# Patient Record
Sex: FEMALE | Race: WHITE | Employment: FULL TIME | URBAN - METROPOLITAN AREA
[De-identification: names, ages, dates, MRNs, and addresses within clinical notes are randomized per-mention and may not be internally consistent; named-entity substitution may affect disease eponyms.]

---

## 2018-08-30 LAB
ANTIBODY SCREEN, EXTERNAL: NEGATIVE
CHLAMYDIA, EXTERNAL: NEGATIVE
HBSAG, EXTERNAL: NEGATIVE
HIV, EXTERNAL: NEGATIVE
N. GONORRHEA, EXTERNAL: NEGATIVE
RUBELLA, EXTERNAL: NORMAL
T. PALLIDUM, EXTERNAL: NEGATIVE

## 2019-03-06 LAB — GRBS, EXTERNAL: NEGATIVE

## 2019-04-07 ENCOUNTER — ANESTHESIA (OUTPATIENT)
Dept: LABOR AND DELIVERY | Age: 29
End: 2019-04-07
Payer: COMMERCIAL

## 2019-04-07 ENCOUNTER — HOSPITAL ENCOUNTER (INPATIENT)
Age: 29
LOS: 2 days | Discharge: HOME | End: 2019-04-09
Attending: OBSTETRICS & GYNECOLOGY | Admitting: OBSTETRICS & GYNECOLOGY
Payer: COMMERCIAL

## 2019-04-07 ENCOUNTER — ANESTHESIA EVENT (OUTPATIENT)
Dept: LABOR AND DELIVERY | Age: 29
End: 2019-04-07
Payer: COMMERCIAL

## 2019-04-07 LAB
BASOPHILS # BLD: 0.1 K/UL (ref 0–0.1)
BASOPHILS NFR BLD: 1 % (ref 0–1)
DIFFERENTIAL METHOD BLD: ABNORMAL
EOSINOPHIL # BLD: 0 K/UL (ref 0–0.4)
EOSINOPHIL NFR BLD: 0 % (ref 0–7)
ERYTHROCYTE [DISTWIDTH] IN BLOOD BY AUTOMATED COUNT: 13 % (ref 11.5–14.5)
HCT VFR BLD AUTO: 37 % (ref 35–47)
HGB BLD-MCNC: 12.7 G/DL (ref 11.5–16)
IMM GRANULOCYTES # BLD AUTO: 0 K/UL
IMM GRANULOCYTES NFR BLD AUTO: 0 %
LYMPHOCYTES # BLD: 1.2 K/UL (ref 0.8–3.5)
LYMPHOCYTES NFR BLD: 8 % (ref 12–49)
MCH RBC QN AUTO: 31.6 PG (ref 26–34)
MCHC RBC AUTO-ENTMCNC: 34.3 G/DL (ref 30–36.5)
MCV RBC AUTO: 92 FL (ref 80–99)
MONOCYTES # BLD: 1.2 K/UL (ref 0–1)
MONOCYTES NFR BLD: 8 % (ref 5–13)
MYELOCYTES NFR BLD MANUAL: 2 %
NEUTS BAND NFR BLD MANUAL: 2 % (ref 0–6)
NEUTS SEG # BLD: 11.8 K/UL (ref 1.8–8)
NEUTS SEG NFR BLD: 79 % (ref 32–75)
NRBC # BLD: 0 K/UL (ref 0–0.01)
NRBC BLD-RTO: 0 PER 100 WBC
PLATELET # BLD AUTO: 146 K/UL (ref 150–400)
PLATELET COMMENTS,PCOM: ABNORMAL
PMV BLD AUTO: 11.5 FL (ref 8.9–12.9)
RBC # BLD AUTO: 4.02 M/UL (ref 3.8–5.2)
RBC MORPH BLD: ABNORMAL
WBC # BLD AUTO: 14.6 K/UL (ref 3.6–11)

## 2019-04-07 PROCEDURE — 59025 FETAL NON-STRESS TEST: CPT

## 2019-04-07 PROCEDURE — 75410000002 HC LABOR FEE PER 1 HR: Performed by: OBSTETRICS & GYNECOLOGY

## 2019-04-07 PROCEDURE — 76060000078 HC EPIDURAL ANESTHESIA: Performed by: ANESTHESIOLOGY

## 2019-04-07 PROCEDURE — 65270000029 HC RM PRIVATE

## 2019-04-07 PROCEDURE — 74011000250 HC RX REV CODE- 250: Performed by: ANESTHESIOLOGY

## 2019-04-07 PROCEDURE — 77030014125 HC TY EPDRL BBMI -B: Performed by: ANESTHESIOLOGY

## 2019-04-07 PROCEDURE — 77010026065 HC OXYGEN MINIMUM MEDICAL AIR: Performed by: OBSTETRICS & GYNECOLOGY

## 2019-04-07 PROCEDURE — 85025 COMPLETE CBC W/AUTO DIFF WBC: CPT

## 2019-04-07 PROCEDURE — 74011250636 HC RX REV CODE- 250/636: Performed by: OBSTETRICS & GYNECOLOGY

## 2019-04-07 PROCEDURE — 75410000000 HC DELIVERY VAGINAL/SINGLE: Performed by: OBSTETRICS & GYNECOLOGY

## 2019-04-07 PROCEDURE — 99283 EMERGENCY DEPT VISIT LOW MDM: CPT

## 2019-04-07 PROCEDURE — 0HQ9XZZ REPAIR PERINEUM SKIN, EXTERNAL APPROACH: ICD-10-PCS | Performed by: OBSTETRICS & GYNECOLOGY

## 2019-04-07 PROCEDURE — 36415 COLL VENOUS BLD VENIPUNCTURE: CPT

## 2019-04-07 PROCEDURE — 74011250636 HC RX REV CODE- 250/636

## 2019-04-07 PROCEDURE — 75410000003 HC RECOV DEL/VAG/CSECN EA 0.5 HR: Performed by: OBSTETRICS & GYNECOLOGY

## 2019-04-07 PROCEDURE — 3E0R3BZ INTRODUCTION OF ANESTHETIC AGENT INTO SPINAL CANAL, PERCUTANEOUS APPROACH: ICD-10-PCS | Performed by: ANESTHESIOLOGY

## 2019-04-07 PROCEDURE — 74011250636 HC RX REV CODE- 250/636: Performed by: ANESTHESIOLOGY

## 2019-04-07 PROCEDURE — 74011000250 HC RX REV CODE- 250

## 2019-04-07 PROCEDURE — 74011250637 HC RX REV CODE- 250/637: Performed by: OBSTETRICS & GYNECOLOGY

## 2019-04-07 RX ORDER — BUPIVACAINE HYDROCHLORIDE 2.5 MG/ML
INJECTION, SOLUTION EPIDURAL; INFILTRATION; INTRACAUDAL AS NEEDED
Status: DISCONTINUED | OUTPATIENT
Start: 2019-04-07 | End: 2019-04-07 | Stop reason: HOSPADM

## 2019-04-07 RX ORDER — SIMETHICONE 80 MG
80 TABLET,CHEWABLE ORAL
Status: DISCONTINUED | OUTPATIENT
Start: 2019-04-07 | End: 2019-04-09 | Stop reason: HOSPADM

## 2019-04-07 RX ORDER — IBUPROFEN 800 MG/1
800 TABLET ORAL EVERY 8 HOURS
Status: DISCONTINUED | OUTPATIENT
Start: 2019-04-07 | End: 2019-04-09 | Stop reason: HOSPADM

## 2019-04-07 RX ORDER — OXYTOCIN/0.9 % SODIUM CHLORIDE 30/500 ML
500 PLASTIC BAG, INJECTION (ML) INTRAVENOUS
Status: DISCONTINUED | OUTPATIENT
Start: 2019-04-07 | End: 2019-04-09 | Stop reason: HOSPADM

## 2019-04-07 RX ORDER — PEPPERMINT OIL
SPIRIT ORAL
Status: DISPENSED
Start: 2019-04-07 | End: 2019-04-08

## 2019-04-07 RX ORDER — HYDROMORPHONE HYDROCHLORIDE 2 MG/ML
1 INJECTION, SOLUTION INTRAMUSCULAR; INTRAVENOUS; SUBCUTANEOUS
Status: COMPLETED | OUTPATIENT
Start: 2019-04-07 | End: 2019-04-07

## 2019-04-07 RX ORDER — OXYTOCIN/RINGER'S LACTATE 20/1000 ML
PLASTIC BAG, INJECTION (ML) INTRAVENOUS
Status: COMPLETED
Start: 2019-04-07 | End: 2019-04-07

## 2019-04-07 RX ORDER — ACETAMINOPHEN 325 MG/1
650 TABLET ORAL
Status: DISCONTINUED | OUTPATIENT
Start: 2019-04-07 | End: 2019-04-09 | Stop reason: HOSPADM

## 2019-04-07 RX ORDER — EPHEDRINE SULFATE/0.9% NACL/PF 50 MG/5 ML
SYRINGE (ML) INTRAVENOUS
Status: DISCONTINUED
Start: 2019-04-07 | End: 2019-04-07 | Stop reason: WASHOUT

## 2019-04-07 RX ORDER — SWAB
1 SWAB, NON-MEDICATED MISCELLANEOUS DAILY
Status: DISCONTINUED | OUTPATIENT
Start: 2019-04-08 | End: 2019-04-09 | Stop reason: HOSPADM

## 2019-04-07 RX ORDER — NALBUPHINE HYDROCHLORIDE 10 MG/ML
2.5 INJECTION, SOLUTION INTRAMUSCULAR; INTRAVENOUS; SUBCUTANEOUS
Status: ACTIVE | OUTPATIENT
Start: 2019-04-07 | End: 2019-04-07

## 2019-04-07 RX ORDER — SODIUM CHLORIDE, SODIUM LACTATE, POTASSIUM CHLORIDE, CALCIUM CHLORIDE 600; 310; 30; 20 MG/100ML; MG/100ML; MG/100ML; MG/100ML
125 INJECTION, SOLUTION INTRAVENOUS CONTINUOUS
Status: DISCONTINUED | OUTPATIENT
Start: 2019-04-07 | End: 2019-04-09 | Stop reason: HOSPADM

## 2019-04-07 RX ORDER — FENTANYL/BUPIVACAINE/NS/PF 2-1250MCG
1-16 PREFILLED PUMP RESERVOIR EPIDURAL CONTINUOUS
Status: DISCONTINUED | OUTPATIENT
Start: 2019-04-07 | End: 2019-04-08 | Stop reason: HOSPADM

## 2019-04-07 RX ORDER — HYDROCODONE BITARTRATE AND ACETAMINOPHEN 5; 325 MG/1; MG/1
1 TABLET ORAL
Status: DISCONTINUED | OUTPATIENT
Start: 2019-04-07 | End: 2019-04-09 | Stop reason: HOSPADM

## 2019-04-07 RX ORDER — HYDROCORTISONE ACETATE PRAMOXINE HCL 2.5; 1 G/100G; G/100G
CREAM TOPICAL AS NEEDED
Status: DISCONTINUED | OUTPATIENT
Start: 2019-04-07 | End: 2019-04-09 | Stop reason: HOSPADM

## 2019-04-07 RX ORDER — MISOPROSTOL 200 UG/1
1000 TABLET ORAL ONCE
Status: COMPLETED | OUTPATIENT
Start: 2019-04-07 | End: 2019-04-07

## 2019-04-07 RX ORDER — LIDOCAINE HYDROCHLORIDE AND EPINEPHRINE 20; 5 MG/ML; UG/ML
INJECTION, SOLUTION EPIDURAL; INFILTRATION; INTRACAUDAL; PERINEURAL AS NEEDED
Status: DISCONTINUED | OUTPATIENT
Start: 2019-04-07 | End: 2019-04-07 | Stop reason: HOSPADM

## 2019-04-07 RX ORDER — ONDANSETRON 2 MG/ML
4 INJECTION INTRAMUSCULAR; INTRAVENOUS
Status: DISCONTINUED | OUTPATIENT
Start: 2019-04-07 | End: 2019-04-09 | Stop reason: HOSPADM

## 2019-04-07 RX ORDER — OXYTOCIN/RINGER'S LACTATE 20/1000 ML
125 PLASTIC BAG, INJECTION (ML) INTRAVENOUS ONCE
Status: COMPLETED | OUTPATIENT
Start: 2019-04-07 | End: 2019-04-07

## 2019-04-07 RX ORDER — DOCUSATE SODIUM 100 MG/1
100 CAPSULE, LIQUID FILLED ORAL
Status: DISCONTINUED | OUTPATIENT
Start: 2019-04-07 | End: 2019-04-09 | Stop reason: HOSPADM

## 2019-04-07 RX ORDER — CEFAZOLIN SODIUM/WATER 2 G/20 ML
2 SYRINGE (ML) INTRAVENOUS ONCE
Status: COMPLETED | OUTPATIENT
Start: 2019-04-07 | End: 2019-04-07

## 2019-04-07 RX ORDER — DIPHENHYDRAMINE HYDROCHLORIDE 50 MG/ML
12.5 INJECTION, SOLUTION INTRAMUSCULAR; INTRAVENOUS
Status: DISCONTINUED | OUTPATIENT
Start: 2019-04-07 | End: 2019-04-09 | Stop reason: HOSPADM

## 2019-04-07 RX ORDER — NALOXONE HYDROCHLORIDE 0.4 MG/ML
0.4 INJECTION, SOLUTION INTRAMUSCULAR; INTRAVENOUS; SUBCUTANEOUS AS NEEDED
Status: DISCONTINUED | OUTPATIENT
Start: 2019-04-07 | End: 2019-04-09 | Stop reason: HOSPADM

## 2019-04-07 RX ADMIN — BUPIVACAINE HYDROCHLORIDE 1 ML: 2.5 INJECTION, SOLUTION EPIDURAL; INFILTRATION; INTRACAUDAL at 17:17

## 2019-04-07 RX ADMIN — SODIUM CHLORIDE, SODIUM LACTATE, POTASSIUM CHLORIDE, AND CALCIUM CHLORIDE 125 ML/HR: 600; 310; 30; 20 INJECTION, SOLUTION INTRAVENOUS at 18:17

## 2019-04-07 RX ADMIN — Medication 12 ML/HR: at 18:22

## 2019-04-07 RX ADMIN — MISOPROSTOL 1000 MCG: 200 TABLET ORAL at 20:54

## 2019-04-07 RX ADMIN — IBUPROFEN 800 MG: 800 TABLET ORAL at 21:46

## 2019-04-07 RX ADMIN — SODIUM CHLORIDE, SODIUM LACTATE, POTASSIUM CHLORIDE, AND CALCIUM CHLORIDE 1000 ML: 600; 310; 30; 20 INJECTION, SOLUTION INTRAVENOUS at 17:30

## 2019-04-07 RX ADMIN — Medication 5000 MILLI-UNITS/HR: at 21:37

## 2019-04-07 RX ADMIN — Medication 10000 MILLI-UNITS/HR: at 20:48

## 2019-04-07 RX ADMIN — SODIUM CHLORIDE, SODIUM LACTATE, POTASSIUM CHLORIDE, AND CALCIUM CHLORIDE 1000 ML: 600; 310; 30; 20 INJECTION, SOLUTION INTRAVENOUS at 17:00

## 2019-04-07 RX ADMIN — OXYTOCIN-SODIUM CHLORIDE 0.9% IV SOLN 30 UNIT/500ML 999 ML/HR: 30-0.9/5 SOLUTION at 19:46

## 2019-04-07 RX ADMIN — HYDROMORPHONE HYDROCHLORIDE 1 MG: 2 INJECTION INTRAMUSCULAR; INTRAVENOUS; SUBCUTANEOUS at 21:00

## 2019-04-07 RX ADMIN — Medication 2 G: at 21:33

## 2019-04-07 RX ADMIN — BUPIVACAINE HYDROCHLORIDE 5 ML: 2.5 INJECTION, SOLUTION EPIDURAL; INFILTRATION; INTRACAUDAL at 17:24

## 2019-04-07 RX ADMIN — LIDOCAINE HYDROCHLORIDE AND EPINEPHRINE 4 ML: 20; 5 INJECTION, SOLUTION EPIDURAL; INFILTRATION; INTRACAUDAL; PERINEURAL at 17:20

## 2019-04-07 NOTE — H&P
History & Physical 
 
Name: Manuel Orozco MRN: [id number]  SSN: [id number] YOB: 1990  Age: 33 y.o. Sex: female Subjective:  
 
Estimated Date of Delivery: 4/10/19 OB History  Para Term  AB Living 2 1 1     1 SAB TAB Ectopic Molar Multiple Live Births 1  
  
# Outcome Date GA Lbr Isac/2nd Weight Sex Delivery Anes PTL Lv  
2 Current 1 Term 14 40w2d  3.74 kg M VAGINAL DELI EPIDURAL AN N TAINA Ms. Alona Sandoval is a  admitted with pregnancy at 39w4d for active labor. The presents with regular painful uterine contractions and she is now comfortable with her epidural.  Denies leakage of vaginal fluid and vaginal bleeding. Prenatal course was normal. Please see prenatal records for details. Prenatal Labs: 
O pos Rubella imm HIV neg Heb B neg TPA neg GC neg Chlamydia neg GBS neg 
1 hr gtt 82 Past Medical History:  
Diagnosis Date  Anemia   
 taking iron pills  Asthma  Chest pain  Depression  Generalized headaches  SOB (shortness of breath) UTI History reviewed. No pertinent surgical history. Social History Occupational History  Not on file Tobacco Use  Smoking status: Never Smoker  Smokeless tobacco: Never Used Substance and Sexual Activity  Alcohol use: No  
  Comment: social  
 Drug use: No  
 Sexual activity: Not on file History reviewed. No pertinent family history. Allergies Allergen Reactions  Erythromycin Unknown (comments)  Sulfa (Sulfonamide Antibiotics) Unknown (comments) Prior to Admission medications Medication Sig Start Date End Date Taking? Authorizing Provider PNV no.24-iron-folic acid-dha (PRENATAL DHA+COMPLETE PRENATAL) 30975-300 mg-mcg-mg cmpk Take  by mouth. Yes Provider, Historical  
ferrous sulfate (IRON) 325 mg (65 mg iron) tablet Take  by mouth Daily (before breakfast).    Yes Provider, Historical  
 sertraline (ZOLOFT) 25 mg tablet Take 50 mg by mouth daily. 11  Yes Provider, Historical  
oxyCODONE-acetaminophen (PERCOCET) 5-325 mg per tablet Take 1 tablet by mouth every six (6) hours as needed for Pain. 14   Rob Samayoa MD  
LODRANE 24 EXTENDED RELEASE 12 mg Cp24  10/20/10   Provider, Historical  
  
 
Review of Systems: Pertinent items are noted in HPI. Objective:  
 
Vitals: 
Vitals:  
 19 1650 19 1655 19 1657 19 1711 BP:  93/77 Pulse: 94 81 Resp: 16 Temp: 98.3 °F (36.8 °C) SpO2: 99%  (!) 89% Weight:    89.4 kg (197 lb) Height:    5' 8\" (1.727 m) Physical Exam: 
Patient without distress. Heart: Regular rate and rhythm or S1S2 present Lung: clear to auscultation throughout lung fields, no wheezes, no rales, no rhonchi and normal respiratory effort Abdomen: soft, nontender, gravid, EFW 8 pounds Fundus: soft and non tender Perineum: blood absent, amniotic fluid absent Cervical Exam: 10/100/-4 vtx, large bbow, adequate pelvimetry Lower Extremities:  - Edema No 
Membranes:  Intact Fetal Heart Rate: Baseline: 125 per minute Variability: moderate Accelerations: yes Decelerations: early Uterine contractions: regular, every 1-2 minutes Assessment/Plan:  
Ass:  at 39 4/7 wks in second stage of labor, cat 1 fetal tracing Plan: Labor down Amniotomy once the fetus is at a lower station Prepare for  Signed By:  Jc Hicks MD   
 2019

## 2019-04-07 NOTE — ANESTHESIA PROCEDURE NOTES
CSE Block    Start time: 4/7/2019 5:12 PM  End time: 4/7/2019 5:28 PM  Performed by: Chelsy Lal MD  Authorized by: Chelsy Lal MD     Pre-Procedure  Indications: procedure for pain    preanesthetic checklist: patient identified, risks and benefits discussed, anesthesia consent, site marked, patient being monitored and timeout performed    Timeout Time: 17:14        Procedure:   Patient Position:  Seated  Prep Region:  Lumbar  Prep: patient draped and chlorhexidine    Location:  L2-3    Epidural Needle:   Needle Type:  Tuohy  Needle Gauge:  17 G  Injection Technique:  Loss of resistance using air  Attempts:  1    Spinal Needle:   Needle Type:  Pencan  Needle Gauge:  27 G    Catheter:   Catheter Type:  Standard  Catheter Size:  20 G  Catheter at Skin Depth (cm):  9  Depth in Epidural Space (cm):  4  Events: no blood with aspiration, no paresthesia, negative aspiration test and CSF confirmed      Assessment:   Catheter Secured:  Tegaderm and tape  Insertion:  Uncomplicated  Patient tolerance:  Patient tolerated the procedure well with no immediate complications
negative

## 2019-04-07 NOTE — ANESTHESIA PREPROCEDURE EVALUATION
Relevant Problems No relevant active problems Anesthetic History No history of anesthetic complications Review of Systems / Medical History Patient summary reviewed, nursing notes reviewed and pertinent labs reviewed Pulmonary Asthma : well controlled Neuro/Psych Pertinent negatives: No psychiatric history Cardiovascular Exercise tolerance: >4 METS 
  
GI/Hepatic/Renal 
Within defined limits Endo/Other Within defined limits Other Findings Physical Exam 
 
Airway Mallampati: II 
 
Neck ROM: normal range of motion Mouth opening: Normal 
 
 Cardiovascular Rhythm: regular Rate: normal 
 
 
 
 Dental 
No notable dental hx Pulmonary Abdominal 
GI exam deferred Other Findings Anesthetic Plan ASA: 2 Anesthesia type: CSE Anesthetic plan and risks discussed with: Patient

## 2019-04-08 PROCEDURE — 65270000029 HC RM PRIVATE

## 2019-04-08 PROCEDURE — 74011250637 HC RX REV CODE- 250/637: Performed by: OBSTETRICS & GYNECOLOGY

## 2019-04-08 PROCEDURE — 77030016394 HC TY CIRC TRIS -B

## 2019-04-08 PROCEDURE — 77030021125

## 2019-04-08 RX ORDER — SERTRALINE HYDROCHLORIDE 50 MG/1
50 TABLET, FILM COATED ORAL EVERY EVENING
Status: DISCONTINUED | OUTPATIENT
Start: 2019-04-08 | End: 2019-04-09 | Stop reason: HOSPADM

## 2019-04-08 RX ADMIN — IBUPROFEN 800 MG: 800 TABLET ORAL at 15:02

## 2019-04-08 RX ADMIN — HYDROCODONE BITARTRATE AND ACETAMINOPHEN 1 TABLET: 5; 325 TABLET ORAL at 10:07

## 2019-04-08 RX ADMIN — SERTRALINE HYDROCHLORIDE 50 MG: 50 TABLET ORAL at 23:17

## 2019-04-08 RX ADMIN — IBUPROFEN 800 MG: 800 TABLET ORAL at 23:17

## 2019-04-08 RX ADMIN — DOCUSATE SODIUM 100 MG: 100 CAPSULE, LIQUID FILLED ORAL at 10:08

## 2019-04-08 RX ADMIN — Medication 1 TABLET: at 10:07

## 2019-04-08 RX ADMIN — SERTRALINE HYDROCHLORIDE 50 MG: 50 TABLET ORAL at 01:08

## 2019-04-08 RX ADMIN — IBUPROFEN 800 MG: 800 TABLET ORAL at 06:26

## 2019-04-08 NOTE — L&D DELIVERY NOTE
Delivery Summary    Patient: Nic Rice MRN: [id number]  SSN: [id number]    YOB: 1990  Age: 33 y.o. Sex: female       Information for the patient's :  [facility][id number]       Labor Events:    Labor:     Rupture Date:     Rupture Time:     Rupture Type     Amniotic Fluid Volume:      Amniotic Fluid Description:       Induction:         Augmentation:     Labor Events:       Cervical Ripening:           Delivery Events:  Episiotomy: None   Laceration(s): 1st     Repaired: Vicryl 3-0    Number of Repair Packets: 1   Suture Type and Size:       Estimated Blood Loss (ml):  ml       Delivery Date: 2019    Delivery Time: 7:41 PM  Delivery Type:    Sex:  Male     Gestational Age: 39w4d   Delivery Clinician:  Megan Finn  Living Status: Living   Delivery Location: L&D L&D 208          APGARS  One minute Five minutes Ten minutes   Skin color: 1   1        Heart rate: 2   2        Grimace: 1   2        Muscle tone: 1   2        Breathin   2        Totals: 6   9            Presentation: Vertex    Position: Left Occiput Anterior  Resuscitation Method:  Suctioning-bulb; Tactile Stimulation     Meconium Stained: Thin      Cord Information: 3 Vessels  Complications: None  Cord around: head  Delayed cord clamping? Yes  Cord clamped date/time:2019  7:44 PM  Disposition of Cord Blood: Lab    Blood Gases Sent?: No    Placenta:  Date/Time: 2019  7:50 PM  Removal: Spontaneous      Appearance: Normal      Measurements:  Birth Weight:        Birth Length:        Head Circumference:        Chest Circumference:       Abdominal Girth:       Other Providers:   Imelda TOBAR;MARY SANCHEZ;BANDAR MARTIN;PEYMAN MELGOZA;SARA CARLIN, Obstetrician;Primary Nurse;Primary  Nurse;Charge Nurse;Staff Nurse           Group B Strep:   Lab Results   Component Value Date/Time    GrBStrep, External Negative 2014     Information for the patient's : Rebecca Leonardo, Male Dharmesh Reynaga [[id number]]   No results found for: ABORH, PCTABR, PCTDIG, BILI, ABORHEXT, ABORH    No results for input(s): PCO2CB, PO2CB, HCO3I, SO2I, IBD, PTEMPI, SPECTI, PHICB, ISITE, IDEV, IALLEN in the last 72 hours. Additional Comments:   of a term live male infant in [address] [place] [address] with apgars 6,9. There was a loose nuchal cord x 1 that was easily reduced. There was a copious amount of amniotic fluid with light meconium staining that measured at 1 liter. The placenta was delivered spontaneously and intact after which the first degree perineal laceration was repaired with 3-0 Vicryl. Vaginal bleeding was heavier than usual with a uterus that remained firm.  ml.

## 2019-04-08 NOTE — PROGRESS NOTES
1650 Patient arrived on the unit complaining of contractions, SVE by RN 10/100/-2. Will admit patient and update MD. 
 
26 Dr Ceci Garcia at the bedside assessing the patient and getting consent for epidural. Patient agreed and was placed in the proper position. [id number] Timeout done with RN, MD and patient all agree after epidural patient was placed in the supine position, right hip tilt and monitor on. Will continue to monitor. 12 Dr Gabi Carvalho at the bedside assessing patient SVE is complete bulging bag. No AROM at this time. [id number] [facility] called to the room. Patient complaining of SROM. RN confirm, Glenis Sharpe MD notified and called to the room. 200 Dr Gabi Carvalho at the bedside for delivery. 1848 Start pushing 1925 Bedside and Verbal shift change report given to Aron Cole RN (oncoming nurse) by Jeremiah Plunkett RN (offgoing nurse). Report included the following information SBAR, Kardex and MAR.

## 2019-04-08 NOTE — DISCHARGE SUMMARY
Obstetrical Discharge Summary     Name: Dion Delgado MRN: [id number]  SSN: [id number]    YOB: 1990  Age: 33 y.o. Sex: female      Admit Date: 4/7/2019    Discharge Date: 4/9/19     Attending Physician:  Yolande Phillips MD     Delivering Physician:  Lizet Graves MD     * Admission Diagnoses:   IUP @ 39w4d      * Discharge Diagnoses:   Delivery of a VMI via TSVD by Lizet Graves MD on 4/8/2019. Apgars were 6 and 9.      1st degree       Additional Diagnoses:   Hospital Problems as of 4/8/2019 Never Reviewed          Codes Class Noted - Resolved POA    Normal labor and delivery ICD-10-CM: O80  ICD-9-CM: 863  4/7/2019 - Present Unknown             Lab Results   Component Value Date/Time    Rubella, External Immune 08/30/2018    GrBStrep, External Negative 03/06/2019    There is no immunization history for the selected administration types on file for this patient. * Procedures:   TSVD         * Discharge Condition: [facility] ([place], [place]) Course: Normal hospital course following the delivery. * Disposition: Home    Discharge Medications:   Current Discharge Medication List          * Follow-up Care/Patient Instructions:   Activity: Activity as tolerated  Diet: Regular Diet  Wound Care: As directed      Followup 6 weeks for PP check        Signed By:  Arelis Luke MD     April 8, 2019

## 2019-04-08 NOTE — ROUTINE PROCESS
SBAR IN Report: Mother Verbal report received from [address] [place] [address] (full name & credentials) on this patient, who is now being transferred from L&D (unit) for routine progression of care. Report consisted of patient's Situation, Background, Assessment and Recommendations (SBAR).  ID bands were compared with the identification form, and verified with the patient and transferring nurse. Information from the SBAR, Kardex, Intake/Output, MAR and Accordion and the Sharri Report was reviewed with the transferring nurse; opportunity for questions and clarification provided.

## 2019-04-08 NOTE — PROGRESS NOTES
Bedside and Verbal shift change report given to Mary Coates (oncoming nurse) by Jael Lizama. Milind Kim (offgoing nurse). Report given with SBAR, Kardex, Intake/Output and MAR.

## 2019-04-08 NOTE — PROGRESS NOTES
: Bedside and Verbal shift change report given to [address] (oncoming nurse) by Maci Bull RN (offgoing nurse). Report included the following information SBAR, Kardex, Intake/Output, MAR, Accordion, Recent Results and Med Rec Status. Assumed care of pt at this time. Pt continues pushing at this time with OB provider remaining at bedside. Pt pushes adequately with positive coaching from care providers. : Delivery of head. OB suctions baby with bulb syringe at perineum. : 40seconds after delivery of head, delivery of viable  male placed skin to skin with mother at this time. Delayed cord clamping performed. : FOB cuts cord with the direction of the OB provider at this time. : RN at bedside for fundal exam. Clots expressed at this time. Request eval from [facility] provider. Pad with clots 40mL Vicky Garcia MD at bedside. VORB 1000mcg of cytotec, 1mg dilaudid for pain relief during bimanual exam.  
 
: Cytotec administered rectally by Jarod Leonard MD  
 
: bimanual clots expressed 80mL total of clots. Running QBL 670mL 
 
: VORB 2g ancef x1  
 
5: With the assistance of the RN, pt successfully ambulates to the . Pt successfully voids 220mL of clear yellow urine at this time. Kelly care performed. Pt successfully ambulates back to me without the assistance of the RN.  
 
0015: TRANSFER - OUT REPORT: 
 
Verbal report given to Stan Luz RN (name) on [facility] Current  being transferred to MIU (unit) for routine progression of care Report consisted of patients Situation, Background, Assessment and  
Recommendations(SBAR). Information from the following report(s) SBAR, Kardex, Intake/Output, MAR, Accordion, Recent Results and Med Rec Status was reviewed with the receiving nurse. Lines:  
Peripheral IV 19 Anterior;Right Hand (Active) Site Assessment Clean, dry, & intact 2019 12:15 AM  
Phlebitis Assessment 0 2019 12:15 AM  
 Infiltration Assessment 0 4/8/2019 12:15 AM  
Dressing Status Clean, dry, & intact 4/8/2019 12:15 AM  
Dressing Type Tape;Transparent 4/8/2019 12:15 AM  
Hub Color/Line Status Pink;Capped 4/8/2019 12:15 AM  
  
 
Opportunity for questions and clarification was provided. Patient transported with: 
 Registered Nurse

## 2019-04-08 NOTE — PROGRESS NOTES
PostPartum Note Nemesio De Santiago 
[id number] 
1990 
33 y.o. 
 
S:  Ms. Nemesio De Santiago is a 33 y.o.  PPD #1 s/p TSVD @ 39w4d. Doing well. She had a baby boy. Her lochia is like a period. She describes her pain as mild and is well controlled with PO medications. She is breast feeding and this is going well. She is ambulating and voiding. Tolerating PO intake. O:  
Visit Vitals BP 99/52 (BP 1 Location: Left arm, BP Patient Position: At rest) Pulse 65 Temp 98.7 °F (37.1 °C) Resp 18 Ht 5' 8\" (1.727 m) Wt 89.4 kg (197 lb) SpO2 96% Breastfeeding? Yes  
BMI 29.95 kg/m² Lab Results Component Value Date/Time WBC 14.6 (H) 2019 05:06 PM  
 HGB 12.7 2019 05:06 PM  
 HCT 37.0 2019 05:06 PM  
 PLATELET 181 (L)  05:06 PM  
 MCV 92.0 2019 05:06 PM  
 Hgb, External 10.6 2014 Hct, External 32.6 2014 Platelet cnt., External 234 2014 Gen - No acute distress Abdomen - Fundus firm, below the umbilicus Ext - Warm, well perfused. Nontender A/P:  PPD #1 s/p TSVD @ 39w4d doing well. 1.  Routine PP instructions/ care discussed 2. Blood type - Rh pos 3. Rubella imm 4. Circumcision desired and consented 5. Discharge home tomorrow 6. F/U 4-6 weeks for PP check. Edwin Morales MD 
[facility]

## 2019-04-08 NOTE — PROGRESS NOTES
The patient was assessed secondary to passage of small clots with uterine massage. Visit Vitals /64 Pulse 63 Temp 98.3 °F (36.8 °C) Resp 16 Ht 5' 8\" (1.727 m) Wt 89.4 kg (197 lb) SpO2 96% Breastfeeding? Yes  
BMI 29.95 kg/m² 1000 micrograms of rectal cytotec was administered along with Dilaudid 1 mg iv x 1 dose. Abdomen: uterus firm at umbilicus -1 Bimanual pelvic exam: small amount of blood and clots were evacuated and the uterus remained firm at umbilicus - 1 Total EBL:  670 ml Ass/Plan: PPD 0 s/p  with vaginal bleeding Receiving 20 units of pitocin in 1 liter of LR at 125 ml/hr Cytotec 1000 micrograms of rectal  cytotec administered Ancef 2 mg iv x 1 dose Continue to monitor on labor and delivery for now

## 2019-04-08 NOTE — LACTATION NOTE
This note was copied from a baby's chart. Assisted mother with waking baby, positioning, and latching at breast.  Baby latched well in side-lying on left side. BF basics reviewed. Mothers using Kathlen Calamity nipple ointment for nipple pain/inflammation. Discussed with mother her plan for feeding. Reviewed the benefits of exclusive breast milk feeding during the hospital stay. Informed her of the risks of using formula to supplement in the first few days of life as well as the benefits of successful breast milk feeding; referred her to the Breastfeeding booklet about this information. She acknowledges understanding of information reviewed and states that it is her plan to breastfeed her infant. Will support her choice and offer additional information as needed. Reviewed breastfeeding basics:  How milk is made and normal  breastfeeding behaviors discussed. Supply and demand,  stomach size, early feeding cues, skin to skin bonding with comfortable positioning and baby led latch-on reviewed. How to identify signs of successful breastfeeding sessions reviewed; education on assymetrical latch, signs of effective latching vs shallow, in-effective latching, normal  feeding frequency and duration and expected infant output discussed. Hand Expression Education:  Mom taught how to manually hand express her colostrum. Emphasized the importance of providing infant with valuable colostrum as infant rests skin to skin at breast.  Aware to avoid extended periods of non-feeding. Aware to offer 10-20+ drops of colostrum every 2-3 hours until infant is latching and nursing effectively. Taught the rationale behind this low tech but highly effective evidence based practice.  
 
Pt will successfully establish breastfeeding by feeding in response to early feeding cues  
or wake every 3h, will obtain deep latch, and will keep log of feedings/output. Taught to BF at hunger cues and or q 2-3 hrs and to offer 10-20 drops of hand expressed colostrum at any non-feeds. Breast Assessment Left Breast: Medium Left Nipple: Everted, Intact Right Breast: Medium Right Nipple: Everted, Intact Breast- Feeding Assessment Attends Breast-Feeding Classes: No 
Breast-Feeding Experience: Yes(nursed first child for 1 week) Breast Trauma/Surgery: No 
Type/Quality: Good Lactation Consultant Visits Breast-Feedings: Good Mother/Infant Observation Mother Observation: Breast comfortable, Recognizes feeding cues Infant Observation: Rhythmic suck, Relaxed after feeding, Opens mouth, Lips flanged, upper, Lips flanged, lower, Feeding cues, Audible swallows LATCH Documentation Latch: Grasps breast, tongue down, lips flanged, rhythmic sucking Audible Swallowing: A few with stimulation Type of Nipple: Everted (after stimulation) Comfort (Breast/Nipple): Soft/non-tender Hold (Positioning): Full assist, teach one side, mother does other, staff holds LATCH Score: 8

## 2019-04-08 NOTE — ROUTINE PROCESS
Bedside and Verbal shift change report given to L. Janie Dakin (oncoming nurse) by Ariadne Lam RN (offgoing nurse). Report included the following information SBAR, Kardex, Procedure Summary, Intake/Output, MAR and Recent Results.

## 2019-04-08 NOTE — ROUTINE PROCESS
Bedside and Verbal shift change report given to Lianna Rodrigues RN (oncoming nurse) by Fabrizio Caban RN (offgoing nurse). Report included the following information SBAR, Kardex, Intake/Output, MAR and Accordion.

## 2019-04-09 VITALS
HEART RATE: 72 BPM | BODY MASS INDEX: 29.86 KG/M2 | HEIGHT: 68 IN | TEMPERATURE: 99.1 F | WEIGHT: 197 LBS | DIASTOLIC BLOOD PRESSURE: 64 MMHG | RESPIRATION RATE: 16 BRPM | SYSTOLIC BLOOD PRESSURE: 103 MMHG | OXYGEN SATURATION: 96 %

## 2019-04-09 LAB
ERYTHROCYTE [DISTWIDTH] IN BLOOD BY AUTOMATED COUNT: 13.2 % (ref 11.5–14.5)
HCT VFR BLD AUTO: 34.6 % (ref 35–47)
HGB BLD-MCNC: 11.4 G/DL (ref 11.5–16)
MCH RBC QN AUTO: 30.7 PG (ref 26–34)
MCHC RBC AUTO-ENTMCNC: 32.9 G/DL (ref 30–36.5)
MCV RBC AUTO: 93.3 FL (ref 80–99)
NRBC # BLD: 0 K/UL (ref 0–0.01)
NRBC BLD-RTO: 0 PER 100 WBC
PLATELET # BLD AUTO: 174 K/UL (ref 150–400)
PMV BLD AUTO: 11.6 FL (ref 8.9–12.9)
RBC # BLD AUTO: 3.71 M/UL (ref 3.8–5.2)
WBC # BLD AUTO: 12.6 K/UL (ref 3.6–11)

## 2019-04-09 PROCEDURE — 36415 COLL VENOUS BLD VENIPUNCTURE: CPT

## 2019-04-09 PROCEDURE — 74011250637 HC RX REV CODE- 250/637: Performed by: OBSTETRICS & GYNECOLOGY

## 2019-04-09 PROCEDURE — 85027 COMPLETE CBC AUTOMATED: CPT

## 2019-04-09 RX ORDER — SERTRALINE HYDROCHLORIDE 50 MG/1
50 TABLET, FILM COATED ORAL EVERY EVENING
Qty: 30 TAB | Refills: 1 | Status: SHIPPED | OUTPATIENT
Start: 2019-04-09

## 2019-04-09 RX ADMIN — Medication 1 TABLET: at 10:06

## 2019-04-09 RX ADMIN — IBUPROFEN 800 MG: 800 TABLET ORAL at 10:06

## 2019-04-09 NOTE — ROUTINE PROCESS
Bedside and Verbal shift change report given to ESTHER Wilson RN (oncoming nurse) by RAFAEL Kaur RN (offgoing nurse). Report included the following information SBAR, Kardex, Procedure Summary, Intake/Output, MAR and Recent Results.

## 2019-04-09 NOTE — PROGRESS NOTES
Post-Partum Day Number 2 Progress Note Patient doing well post-partum without significant complaints. Voiding without difficulty, normal lochia. Vitals:   
Patient Vitals for the past 24 hrs: 
 BP Temp Pulse Resp  
19 0325 103/64 99.1 °F (37.3 °C) 72 16  
19 2206 107/58 99.4 °F (37.4 °C) 65 16  
19 1510 98/52 99 °F (37.2 °C) 76 18 Temp (24hrs), Av.2 °F (37.3 °C), Min:99 °F (37.2 °C), Max:99.4 °F (37.4 °C) Vital signs stable, afebrile. Exam:  Patient without distress. Abdomen soft, fundus firm, nontender Lower extremities, CARA nontender w/o edema Labs:  
Recent Results (from the past 24 hour(s)) CBC W/O DIFF Collection Time: 19  3:21 AM  
Result Value Ref Range WBC 12.6 (H) 3.6 - 11.0 K/uL  
 RBC 3.71 (L) 3.80 - 5.20 M/uL  
 HGB 11.4 (L) 11.5 - 16.0 g/dL HCT 34.6 (L) 35.0 - 47.0 % MCV 93.3 80.0 - 99.0 FL  
 MCH 30.7 26.0 - 34.0 PG  
 MCHC 32.9 30.0 - 36.5 g/dL  
 RDW 13.2 11.5 - 14.5 % PLATELET 818 184 - 160 K/uL MPV 11.6 8.9 - 12.9 FL  
 NRBC 0.0 0  WBC ABSOLUTE NRBC 0.00 0.00 - 0.01 K/uL Assessment and Plan:  PPD 2, stable, routine care Discharge today-instructions reviewed. Stable w/ OTC meds. circ consented and performed. See note

## 2019-04-09 NOTE — DISCHARGE INSTRUCTIONS
Discharge Instructions for Vaginal Delivery    Patient ID:  Abhinav Srivastava  [id number]  33 y.o.  1990    Take Home Medications   Call to make an appointment for follow up in the next two weeks for depression medication check. zoloft increased to 50mg daily while in house    Continue taking your prenatal vitamins if you are breastfeeding. Follow-up care is a key part of your treatment and safety. Please schedule and keep appointments. Follow-up with your primary OB in 6 weeks. Activity  Avoid anything in your vagina for 6 weeks (no intercourse, tampons, or douching). You may drive unless you are taking prescription pain medications. Climbing stairs and light lifting are okay. Please avoid excessive exercise, though walking is okay- you'll be tired! Diet  Regular diet as tolerated. Be sure to drink plenty of fluids if you are breastfeeding. Wound care  If you have stitches, continue to rinse with a squirt bottle of warm water each time you void for about 7-10 days. .  Your stitches will gradually dissolve over four to eight weeks. Sitz baths are also helpful to keep the wound clean, encourage healing, and to help with pain associated with the stitches or hemorrhoids. You can use either a sitz bath basin or a bathtub filled with 2-3\" inches of plain warm water. Soak for 10 minutes 3 times a day as tolerated. Pain Management  1. Over the counter medications such as Tylenol and ibuprofen (Motrin or Advil) are ideal.  These may be taken together, alternating doses. You may  take the maximum dose:  Motrin or Advil (generic ibuprofen), either 3 tablets every 6 hours or 4 tablets every 8 hours or Tylenol (acetominophen) 1000mg every 6 hours (equivalent to 2 extra strength Tylenol). 2. You may also have a precrescription for stronger pain medication. Take only as needed and transition to over the counter medication in the next few days.  Minimize amounts of the prescription medication, as it can be habit-forming and will worsen or cause constipation. Most patients will find that within a couple of days, their pain is adequately controlled using only over-the-counter medications. 3. The prescription pain medication is mixed with Tylenol, therefore, you should not take any extra Tylenol or acetaminophen until you have reduced your prescription pain medication. 4. Add heating pad or sitz baths as needed. Add hemorrhoid wipes or ointments if needed    Constipation  1. Constipation is normal after pregnancy and delivery, especially while taking prescription narcotic pain medication. 2. Over the counter remedies including ducosate (Colace), take 1-2 capsules 1-2 times daily for soft stool as needed. You may also add/ try milk of magnesia or rectal remedies such as Dulcolax or Fleets enema. Recovery: What to Expect at Home  1. Fatigue is expected. Try to rest when you can and don't worry about doing housework or other tasks which can wait. 2. The soreness along your bottom will improve significantly over the first 2 weeks, but it may take 6 weeks before you are completely recovered. 3. Back pain or general body aches or muscle soreness are expected and should improve with acetominophen or ibuprofen. 4. Leg swelling due to pregnancy and/or IV fluids given in the hospital will take about two weeks to resolve. 5. Most women experience some form of the \"Baby Blues\" after having a baby. Feeling emotional, tearful, frustrated, anxious, sad, and irritable some of the time is normal and go away after about 2 weeks. Adequate rest and help from your family will help. Take breaks from caring for the baby. Call your doctor if your symptoms seem severe, last more than 2 weeks, or seem to be getting worse instead of better. Get help immediately if you have thoughts of wanting to hurt yourself or others!       Call your doctor or seek immediate medical care if you have:  Heavy vaginal bleeding, soaking through one or more pads an hour for several hours. Foul-smelling discharge from your vagina or incision. Consistent nausea and vomiting and cannot keep fluids down. Consistent pain that does not get better after you take pain medicine.   Sudden chest pain and shortness of breath  Signs of a blood clot: pain/ swelling/ increasing redness in your lower extremeties  Signs of infection: increased pain in your abdomen or vaginal area; red streaks, warmth, or tenderness of your breasts; fever of 100.5 F or greater

## 2019-04-09 NOTE — LACTATION NOTE
This note was copied from a baby's chart. Mom and baby are for discharge today. Mother states baby has been latching on and breastfeeding well. Baby nursed well for 15 minutes during [facility] visit. LC discussed the following: 
 
Reviewed breastfeeding basics:  Supply and demand, breastfeed baby 8-12 times in 24 hr., feed baby on demand,   stomach size, early  Feeding cues, skin to skin, positioning and baby led latch-on, assymetrical latch with signs of good, deep latch vs shallow, feeding frequency and duration, and log sheet for tracking infant feedings and output. Breastfeeding Booklet and Warm line information given. Discussed typical  weight loss and the importance of infant weight checks with pediatrician 1-2 post discharge. Engorgement Care Guidelines:  Reviewed how milk is made and normal phases of milk production. Taught care of engorged breasts - frequent breastfeeding encouraged, cool packs and motrin as tolerated. Anticipatory guidance shared. Care for sore/tender nipples discussed:  ways to improve positioning and latch practiced and discussed, hand express colostrum after feedings and let air dry, light application of lanolin, hydrogel pads, seek comfortable laid back feeding position, start feedings on least sore side first. 
 
Discussed eating a healthy diet. Instructed mother to eat a variety of foods in order to get a well balanced diet. She should consume an extra 500 calories per day (more than her non-pregnant requirement.) These extra calories will help provide energy needed for optimal breast milk production. Mother also encouraged to \"drink to thirst\" and it is recommended that she drink fluids such as water, fruit/vegetable juice. Nutritious snacks should be available so that she can eat throughout the day to help satisfy her hunger and maintain a good milk supply. Discussed pumping/storage and preparation of expressed breast milk.  Mother has a Spectra pump - instructions for use given. Pt will successfully establish breastfeeding by feeding in response to early feeding cues  
or wake every 3h, will obtain deep latch, and will keep log of feedings/output. Taught to BF at hunger cues and or q 2-3 hrs and to offer 10-20 drops of hand expressed colostrum at any non-feeds. Breast Assessment Left Breast: Medium Left Nipple: Everted, Intact, Tender(Nipple tender from prior feeding - baby cluster fed last night) Right Breast: Medium Right Nipple: Everted, Intact Breast- Feeding Assessment Attends Breast-Feeding Classes: No 
Breast-Feeding Experience: Yes Breast Trauma/Surgery: No 
Type/Quality: Good(Mother states baby cluster fed last night and her milk is coming in) Lactation Consultant Visits Breast-Feedings: Good (Baby circumcised this am. He latched on well to left breast in side lying position and nursed for 15 minutes. Mother and baby are for D/C today. ) Mother/Infant Observation Mother Observation: Alignment, Breast comfortable, Close hold, Holds breast, Lets baby end feeding, Nipple round on release, Recognizes feeding cues Infant Observation: Audible swallows, Feeding cues, Latches nipple and aereolae, Lips flanged, upper, Relaxed after feeding, Lips flanged, lower, Opens mouth, Rhythmic suck LATCH Documentation Latch: Grasps breast, tongue down, lips flanged, rhythmic sucking Audible Swallowing: A few with stimulation Type of Nipple: Everted (after stimulation) Comfort (Breast/Nipple): Filling, red/small blisters/bruises, mild/mod discomfort(left nipple is tender but intact) Hold (Positioning): No assist from staff, mother able to position/hold infant LATCH Score: 8

## 2019-04-09 NOTE — ROUTINE PROCESS
Patient off unit in stable condition via wheelchair with volunteers for discharge home per Dr. Abril Harry. Patient is to follow up in 2 weeks and is aware. Prescriptions given to patient. Patient denies any headache, dizziness, nausea/vomitting, or pain at this time. Infant in car seat with mother.

## 2023-12-04 ENCOUNTER — OFFICE VISIT (OUTPATIENT)
Facility: CLINIC | Age: 33
End: 2023-12-04
Payer: COMMERCIAL

## 2023-12-04 VITALS
DIASTOLIC BLOOD PRESSURE: 57 MMHG | WEIGHT: 181.6 LBS | RESPIRATION RATE: 16 BRPM | HEART RATE: 66 BPM | HEIGHT: 68 IN | BODY MASS INDEX: 27.52 KG/M2 | TEMPERATURE: 98.2 F | OXYGEN SATURATION: 98 % | SYSTOLIC BLOOD PRESSURE: 103 MMHG

## 2023-12-04 DIAGNOSIS — R10.10 PAIN OF UPPER ABDOMEN: ICD-10-CM

## 2023-12-04 DIAGNOSIS — R10.10 PAIN OF UPPER ABDOMEN: Primary | ICD-10-CM

## 2023-12-04 DIAGNOSIS — Z80.9 FAMILY HISTORY OF CANCER: ICD-10-CM

## 2023-12-04 DIAGNOSIS — Z83.79 FAMILY HISTORY OF CELIAC DISEASE: ICD-10-CM

## 2023-12-04 DIAGNOSIS — Z11.59 NEED FOR HEPATITIS C SCREENING TEST: ICD-10-CM

## 2023-12-04 PROCEDURE — 99204 OFFICE O/P NEW MOD 45 MIN: CPT | Performed by: STUDENT IN AN ORGANIZED HEALTH CARE EDUCATION/TRAINING PROGRAM

## 2023-12-04 SDOH — ECONOMIC STABILITY: HOUSING INSECURITY
IN THE LAST 12 MONTHS, WAS THERE A TIME WHEN YOU DID NOT HAVE A STEADY PLACE TO SLEEP OR SLEPT IN A SHELTER (INCLUDING NOW)?: NO

## 2023-12-04 SDOH — ECONOMIC STABILITY: FOOD INSECURITY: WITHIN THE PAST 12 MONTHS, YOU WORRIED THAT YOUR FOOD WOULD RUN OUT BEFORE YOU GOT MONEY TO BUY MORE.: NEVER TRUE

## 2023-12-04 SDOH — ECONOMIC STABILITY: INCOME INSECURITY: HOW HARD IS IT FOR YOU TO PAY FOR THE VERY BASICS LIKE FOOD, HOUSING, MEDICAL CARE, AND HEATING?: NOT HARD AT ALL

## 2023-12-04 SDOH — ECONOMIC STABILITY: FOOD INSECURITY: WITHIN THE PAST 12 MONTHS, THE FOOD YOU BOUGHT JUST DIDN'T LAST AND YOU DIDN'T HAVE MONEY TO GET MORE.: NEVER TRUE

## 2023-12-04 ASSESSMENT — ENCOUNTER SYMPTOMS
COUGH: 0
DIARRHEA: 0
SORE THROAT: 0
FACIAL SWELLING: 0
SHORTNESS OF BREATH: 0
ABDOMINAL PAIN: 1

## 2023-12-04 ASSESSMENT — PATIENT HEALTH QUESTIONNAIRE - PHQ9
1. LITTLE INTEREST OR PLEASURE IN DOING THINGS: 0
SUM OF ALL RESPONSES TO PHQ QUESTIONS 1-9: 0
2. FEELING DOWN, DEPRESSED OR HOPELESS: 0
SUM OF ALL RESPONSES TO PHQ9 QUESTIONS 1 & 2: 0

## 2023-12-04 NOTE — PROGRESS NOTES
Jose Ramon Mixon (: 1990) is a [address] [facility] y.o. female, New patient patient, here for evaluation of the following chief complaint(s):  Establish Care (Discuss some family health issues. Siblings recently diagnosed with Celiac disease, mom recently diagnosed with primary peritoneal cancer (hasn't seen PCP in 4 years))       ASSESSMENT/PLAN:  Below is the assessment and plan developed based on review of pertinent history, physical exam, labs, studies, and medications. 1. Pain of upper abdomen  -     CBC; Future  -     Comprehensive Metabolic Panel; Future  -     Aries Cai MD, Gastroenter[facility], [place]  2. Family history of celiac disease  -     CBC; Future  -     TSH with Reflex to FT4; Future  -     Ferritin; Future  -     Iron and TIBC; Future  -     Aries Cai MD, Gastroenterology, [place]  3. Family history of cancer  -     Amb External Referral To Genetics  4. Need for hepatitis C screening test  -     Hepatitis C Antibody; Future    New patient, presents to the clinic to establish care. Given referral to gastroenterologist for diagnosis of celiac disease. Blood work as above. Also told to discuss with gastroenterologist about when she should get a colonoscopy given family history of MSH2 mutation. Referral to genetics given for family history of [facility] 2 mutation. Return in about 6 months (around 2024) for follow up. SUBJECTIVE/OBJECTIVE:  HPI    Jose Ramon Mixon is a 42-year-old who presents to clinic to establish care. She is currently not on any medications. She was on medications for postpartum depression in 2019. Sx are controlled. She complains of abdominal pain, involving the lower abdomen, on and off since last couple years. No diarrhea, but does complain of occasional constipation, no blood in stools. She states both her sisters  were diagnosed with celiac disease. No heartburn sx.      She also states her mom had cancer that involved the

## 2023-12-06 ENCOUNTER — TELEPHONE (OUTPATIENT)
Age: 33
End: 2023-12-06

## 2023-12-06 NOTE — TELEPHONE ENCOUNTER
Left a voicemail on 12/06 at 10:10 AM. Needs to schedule a New Patient appointment with Dr. Etelvina Nicholson. I returned the patients call on 12/06 at 2:36 PM. Pt was informed that in order for us to schedule her appointment we need to have her previous OBGYN records faxed to our office. Fax number was given and she will request her records from [facility].

## 2023-12-23 LAB
ALBUMIN SERPL-MCNC: 4.6 G/DL (ref 3.9–4.9)
ALBUMIN/GLOB SERPL: 2.2 {RATIO} (ref 1.2–2.2)
ALP SERPL-CCNC: 43 IU/L (ref 44–121)
ALT SERPL-CCNC: 10 IU/L (ref 0–32)
AST SERPL-CCNC: 10 IU/L (ref 0–40)
BILIRUB SERPL-MCNC: 0.4 MG/DL (ref 0–1.2)
BUN SERPL-MCNC: 14 MG/DL (ref 6–20)
BUN/CREAT SERPL: 19 (ref 9–23)
CALCIUM SERPL-MCNC: 9.1 MG/DL (ref 8.7–10.2)
CHLORIDE SERPL-SCNC: 104 MMOL/L (ref 96–106)
CO2 SERPL-SCNC: 24 MMOL/L (ref 20–29)
CREAT SERPL-MCNC: 0.74 MG/DL (ref 0.57–1)
EGFRCR SERPLBLD CKD-EPI 2021: 109 ML/MIN/1.73
ERYTHROCYTE [DISTWIDTH] IN BLOOD BY AUTOMATED COUNT: 11.9 % (ref 11.7–15.4)
FERRITIN SERPL-MCNC: 45 NG/ML (ref 15–150)
GLOBULIN SER CALC-MCNC: 2.1 G/DL (ref 1.5–4.5)
GLUCOSE SERPL-MCNC: 94 MG/DL (ref 70–99)
HCT VFR BLD AUTO: 39.5 % (ref 34–46.6)
HCV IGG SERPL QL IA: NON REACTIVE
HGB BLD-MCNC: 13.1 G/DL (ref 11.1–15.9)
IRON SATN MFR SERPL: 18 % (ref 15–55)
IRON SERPL-MCNC: 50 UG/DL (ref 27–159)
MCH RBC QN AUTO: 29.4 PG (ref 26.6–33)
MCHC RBC AUTO-ENTMCNC: 33.2 G/DL (ref 31.5–35.7)
MCV RBC AUTO: 89 FL (ref 79–97)
PLATELET # BLD AUTO: 209 X10E3/UL (ref 150–450)
POTASSIUM SERPL-SCNC: 4.2 MMOL/L (ref 3.5–5.2)
PROT SERPL-MCNC: 6.7 G/DL (ref 6–8.5)
RBC # BLD AUTO: 4.45 X10E6/UL (ref 3.77–5.28)
SODIUM SERPL-SCNC: 138 MMOL/L (ref 134–144)
TIBC SERPL-MCNC: 284 UG/DL (ref 250–450)
TSH SERPL DL<=0.005 MIU/L-ACNC: 0.7 UIU/ML (ref 0.45–4.5)
UIBC SERPL-MCNC: 234 UG/DL (ref 131–425)
WBC # BLD AUTO: 5.1 X10E3/UL (ref 3.4–10.8)

## 2024-02-13 NOTE — PRE-PROCEDURE INSTRUCTIONS
Pat completed with patient for procedure on 2/14/24. Patient verbalized understanding of arrival time of 0900, npo status, and the need for a ride home.

## 2024-02-14 ENCOUNTER — HOSPITAL ENCOUNTER (OUTPATIENT)
Facility: HOSPITAL | Age: 34
Setting detail: OUTPATIENT SURGERY
Discharge: HOME | End: 2024-02-14
Attending: INTERNAL MEDICINE | Admitting: INTERNAL MEDICINE
Payer: COMMERCIAL

## 2024-02-14 ENCOUNTER — ANESTHESIA EVENT (OUTPATIENT)
Facility: HOSPITAL | Age: 34
End: 2024-02-14
Payer: COMMERCIAL

## 2024-02-14 ENCOUNTER — ANESTHESIA (OUTPATIENT)
Facility: HOSPITAL | Age: 34
End: 2024-02-14
Payer: COMMERCIAL

## 2024-02-14 VITALS
OXYGEN SATURATION: 100 % | DIASTOLIC BLOOD PRESSURE: 61 MMHG | TEMPERATURE: 97.9 F | SYSTOLIC BLOOD PRESSURE: 99 MMHG | HEART RATE: 65 BPM | RESPIRATION RATE: 16 BRPM

## 2024-02-14 DIAGNOSIS — R10.9 ABDOMINAL PAIN, UNSPECIFIED ABDOMINAL LOCATION: ICD-10-CM

## 2024-02-14 LAB — HCG UR QL: NEGATIVE

## 2024-02-14 PROCEDURE — 88305 TISSUE EXAM BY PATHOLOGIST: CPT

## 2024-02-14 PROCEDURE — 3600007502: Performed by: INTERNAL MEDICINE

## 2024-02-14 PROCEDURE — 3700000000 HC ANESTHESIA ATTENDED CARE: Performed by: INTERNAL MEDICINE

## 2024-02-14 PROCEDURE — 3600007512: Performed by: INTERNAL MEDICINE

## 2024-02-14 PROCEDURE — 7100000011 HC PHASE II RECOVERY - ADDTL 15 MIN: Performed by: INTERNAL MEDICINE

## 2024-02-14 PROCEDURE — 3700000001 HC ADD 15 MINUTES (ANESTHESIA): Performed by: INTERNAL MEDICINE

## 2024-02-14 PROCEDURE — 2500000003 HC RX 250 WO HCPCS: Performed by: NURSE PRACTITIONER

## 2024-02-14 PROCEDURE — 6360000002 HC RX W HCPCS: Performed by: NURSE PRACTITIONER

## 2024-02-14 PROCEDURE — 2709999900 HC NON-CHARGEABLE SUPPLY: Performed by: INTERNAL MEDICINE

## 2024-02-14 PROCEDURE — 2580000003 HC RX 258: Performed by: INTERNAL MEDICINE

## 2024-02-14 PROCEDURE — 7100000010 HC PHASE II RECOVERY - FIRST 15 MIN: Performed by: INTERNAL MEDICINE

## 2024-02-14 PROCEDURE — 81025 URINE PREGNANCY TEST: CPT

## 2024-02-14 RX ORDER — GLYCOPYRROLATE 0.2 MG/ML
INJECTION INTRAMUSCULAR; INTRAVENOUS PRN
Status: DISCONTINUED | OUTPATIENT
Start: 2024-02-14 | End: 2024-02-14 | Stop reason: SDUPTHER

## 2024-02-14 RX ORDER — SODIUM CHLORIDE, SODIUM LACTATE, POTASSIUM CHLORIDE, CALCIUM CHLORIDE 600; 310; 30; 20 MG/100ML; MG/100ML; MG/100ML; MG/100ML
INJECTION, SOLUTION INTRAVENOUS CONTINUOUS
Status: DISCONTINUED | OUTPATIENT
Start: 2024-02-14 | End: 2024-02-14 | Stop reason: HOSPADM

## 2024-02-14 RX ORDER — PROPOFOL 10 MG/ML
INJECTION, EMULSION INTRAVENOUS PRN
Status: DISCONTINUED | OUTPATIENT
Start: 2024-02-14 | End: 2024-02-14 | Stop reason: SDUPTHER

## 2024-02-14 RX ORDER — LIDOCAINE HYDROCHLORIDE 20 MG/ML
INJECTION, SOLUTION EPIDURAL; INFILTRATION; INTRACAUDAL; PERINEURAL PRN
Status: DISCONTINUED | OUTPATIENT
Start: 2024-02-14 | End: 2024-02-14 | Stop reason: SDUPTHER

## 2024-02-14 RX ORDER — PHENYLEPHRINE HCL IN 0.9% NACL 1 MG/10 ML
SYRINGE (ML) INTRAVENOUS PRN
Status: DISCONTINUED | OUTPATIENT
Start: 2024-02-14 | End: 2024-02-14 | Stop reason: SDUPTHER

## 2024-02-14 RX ADMIN — GLYCOPYRROLATE 0.2 MG: 0.2 INJECTION INTRAMUSCULAR; INTRAVENOUS at 10:03

## 2024-02-14 RX ADMIN — Medication 100 MCG: at 10:06

## 2024-02-14 RX ADMIN — PROPOFOL 50 MG: 10 INJECTION, EMULSION INTRAVENOUS at 10:10

## 2024-02-14 RX ADMIN — PROPOFOL 70 MG: 10 INJECTION, EMULSION INTRAVENOUS at 10:06

## 2024-02-14 RX ADMIN — SODIUM CHLORIDE, POTASSIUM CHLORIDE, SODIUM LACTATE AND CALCIUM CHLORIDE: 600; 310; 30; 20 INJECTION, SOLUTION INTRAVENOUS at 09:38

## 2024-02-14 RX ADMIN — PROPOFOL 30 MG: 10 INJECTION, EMULSION INTRAVENOUS at 10:08

## 2024-02-14 RX ADMIN — Medication 100 MCG: at 10:09

## 2024-02-14 RX ADMIN — PROPOFOL 50 MG: 10 INJECTION, EMULSION INTRAVENOUS at 10:12

## 2024-02-14 RX ADMIN — LIDOCAINE HYDROCHLORIDE 100 MG: 20 INJECTION, SOLUTION EPIDURAL; INFILTRATION; INTRACAUDAL; PERINEURAL at 10:03

## 2024-02-14 NOTE — ANESTHESIA POSTPROCEDURE EVALUATION
Department of Anesthesiology  Postprocedure Note    Patient: Eboni Alvarez  MRN: [id number]  YOB: 1990  Date of evaluation: 2/14/2024    Procedure Summary       Date: 02/14/24 Room / Location: [facility] ENDO 01 / [facility] ENDOSCOPY    Anesthesia Start: 0959 Anesthesia Stop: 1020    Procedures:       EGD ESOPHAGOGASTRODUODENOSCOPY (Upper GI Region)      EGD BIOPSY (Upper GI Region) Diagnosis:       Abdominal pain, unspecified abdominal location      (Abdominal pain, unspecified abdominal location [R10.9])    Surgeons: Jude Ritchie MD Responsible Provider: Regina Amador MD    Anesthesia Type: MAC, TIVA ASA Status: 2            Anesthesia Type: No value filed.    Greta Phase I: Greta Score: 10    Greta Phase II:      Anesthesia Post Evaluation    Patient location during evaluation: bedside (Endoscopy Unit)  Patient participation: complete - patient participated  Level of consciousness: sleepy but conscious  Pain score: 0  Airway patency: patent  Nausea & Vomiting: no nausea and no vomiting  Cardiovascular status: hemodynamically stable  Respiratory status: acceptable  Hydration status: stable  Comments: This patient remained on the stretcher.  The patient was handed off to the endoscopy nursing team.  All questions regarding pre-, intra-, and postoperative care were answered.  Multimodal analgesia pain management approach    No notable events documented.

## 2024-02-14 NOTE — ANESTHESIA PRE PROCEDURE
Department of Anesthesiology  Preprocedure Note       Name:  Eboni Alvarez   Age:  33 y.o.  :  1990                                          MRN:  [id number]         Date:  2024      Surgeon: Surgeon(s):  Jude Ritchie MD    Procedure: Procedure(s):  EGD ESOPHAGOGASTRODUODENOSCOPY    Medications prior to admission:   Prior to Admission medications    Medication Sig Start Date End Date Taking? Authorizing Provider   Multiple Vitamin (MULTIVITAMIN ADULT PO) Take by mouth daily   Yes Provider, MD Prasanna       Current medications:    Current Facility-Administered Medications   Medication Dose Route Frequency Provider Last Rate Last Admin   • lactated ringers IV soln infusion   IntraVENous Continuous Jude Ritchie  mL/hr at 24 0942 NoRateChange at 24 0942       Allergies:    Allergies   Allergen Reactions   • Erythromycin Anaphylaxis   • Sulfur Anaphylaxis       Problem List:    Patient Active Problem List   Diagnosis Code   • Pregnancy Z34.90   • Normal labor and delivery O80       Past Medical History:        Diagnosis Date   • Anxiety    • Asthma    • Depression        Past Surgical History:        Procedure Laterality Date   • WISDOM TOOTH EXTRACTION         Social History:    Social History     Tobacco Use   • Smoking status: Never     Passive exposure: Never   • Smokeless tobacco: Never   Substance Use Topics   • Alcohol use: Yes     Comment: rarely                                Counseling given: Not Answered      Vital Signs (Current):   Vitals:    24 0919   BP: (!) 92/57   Pulse: 78   Resp: 18   Temp: 98.4 °F (36.9 °C)   TempSrc: Oral   SpO2: 95%                                              BP Readings from Last 3 Encounters:   24 (!) 92/57   23 (!) 103/57       NPO Status: Time of last liquid consumption:                         Time of last solid consumption:                         Date of last liquid consumption: 24

## 2024-06-05 ENCOUNTER — TELEPHONE (OUTPATIENT)
Facility: CLINIC | Age: 34
End: 2024-06-05

## 2024-06-05 ENCOUNTER — OFFICE VISIT (OUTPATIENT)
Facility: CLINIC | Age: 34
End: 2024-06-05
Payer: COMMERCIAL

## 2024-06-05 VITALS
OXYGEN SATURATION: 99 % | SYSTOLIC BLOOD PRESSURE: 97 MMHG | RESPIRATION RATE: 18 BRPM | DIASTOLIC BLOOD PRESSURE: 60 MMHG | TEMPERATURE: 98.8 F | BODY MASS INDEX: 27.52 KG/M2 | WEIGHT: 181.6 LBS | HEART RATE: 67 BPM | HEIGHT: 68 IN

## 2024-06-05 DIAGNOSIS — F41.9 ANXIETY: Primary | ICD-10-CM

## 2024-06-05 DIAGNOSIS — R41.840 DIFFICULTY CONCENTRATING: ICD-10-CM

## 2024-06-05 PROCEDURE — 99214 OFFICE O/P EST MOD 30 MIN: CPT | Performed by: STUDENT IN AN ORGANIZED HEALTH CARE EDUCATION/TRAINING PROGRAM

## 2024-06-05 RX ORDER — BUSPIRONE HYDROCHLORIDE 5 MG/1
5 TABLET ORAL 2 TIMES DAILY
Qty: 60 TABLET | Refills: 2 | Status: SHIPPED | OUTPATIENT
Start: 2024-06-05 | End: 2024-09-03

## 2024-06-05 RX ORDER — PROPRANOLOL HYDROCHLORIDE 10 MG/1
10 TABLET ORAL 3 TIMES DAILY PRN
COMMUNITY

## 2024-06-05 ASSESSMENT — PATIENT HEALTH QUESTIONNAIRE - PHQ9
1. LITTLE INTEREST OR PLEASURE IN DOING THINGS: NOT AT ALL
SUM OF ALL RESPONSES TO PHQ9 QUESTIONS 1 & 2: 0
SUM OF ALL RESPONSES TO PHQ QUESTIONS 1-9: 0
9. THOUGHTS THAT YOU WOULD BE BETTER OFF DEAD, OR OF HURTING YOURSELF: NOT AT ALL
5. POOR APPETITE OR OVEREATING: NOT AT ALL
SUM OF ALL RESPONSES TO PHQ QUESTIONS 1-9: 0
6. FEELING BAD ABOUT YOURSELF - OR THAT YOU ARE A FAILURE OR HAVE LET YOURSELF OR YOUR FAMILY DOWN: NOT AT ALL
7. TROUBLE CONCENTRATING ON THINGS, SUCH AS READING THE NEWSPAPER OR WATCHING TELEVISION: NOT AT ALL
SUM OF ALL RESPONSES TO PHQ QUESTIONS 1-9: 0
2. FEELING DOWN, DEPRESSED OR HOPELESS: NOT AT ALL
SUM OF ALL RESPONSES TO PHQ QUESTIONS 1-9: 0
10. IF YOU CHECKED OFF ANY PROBLEMS, HOW DIFFICULT HAVE THESE PROBLEMS MADE IT FOR YOU TO DO YOUR WORK, TAKE CARE OF THINGS AT HOME, OR GET ALONG WITH OTHER PEOPLE: NOT DIFFICULT AT ALL
3. TROUBLE FALLING OR STAYING ASLEEP: NOT AT ALL
4. FEELING TIRED OR HAVING LITTLE ENERGY: NOT AT ALL
8. MOVING OR SPEAKING SO SLOWLY THAT OTHER PEOPLE COULD HAVE NOTICED. OR THE OPPOSITE, BEING SO FIGETY OR RESTLESS THAT YOU HAVE BEEN MOVING AROUND A LOT MORE THAN USUAL: NOT AT ALL

## 2024-06-05 ASSESSMENT — ENCOUNTER SYMPTOMS
DIARRHEA: 0
SHORTNESS OF BREATH: 0
CONSTIPATION: 0

## 2024-06-05 ASSESSMENT — ANXIETY QUESTIONNAIRES
6. BECOMING EASILY ANNOYED OR IRRITABLE: NEARLY EVERY DAY
GAD7 TOTAL SCORE: 12
1. FEELING NERVOUS, ANXIOUS, OR ON EDGE: NEARLY EVERY DAY
4. TROUBLE RELAXING: NEARLY EVERY DAY
5. BEING SO RESTLESS THAT IT IS HARD TO SIT STILL: NOT AT ALL
7. FEELING AFRAID AS IF SOMETHING AWFUL MIGHT HAPPEN: SEVERAL DAYS
3. WORRYING TOO MUCH ABOUT DIFFERENT THINGS: SEVERAL DAYS
2. NOT BEING ABLE TO STOP OR CONTROL WORRYING: SEVERAL DAYS

## 2024-06-05 NOTE — TELEPHONE ENCOUNTER
I am not sure if they discussed her genetic test results with patient, I would recommend she call back and schedule a follow-up appointment.  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  I am not sure what the significance of this.  So she should discuss this with them.

## 2024-06-05 NOTE — PROGRESS NOTES
\"Have you been to the ER, urgent care clinic since your last visit?  Hospitalized since your last visit?\"    NO    “Have you seen or consulted any other health care providers outside of [facility] since your last visit?”    YES - When: approximately 2 months ago.  Where and Why: Genetic counseling .    Saw gastroenterologist Dr. Ritchie and had an endoscopy done      “Have you had a pap smear?”    YES - Where: [facility] Nurse/[facility]MA to request most recent records if not in the chart    No cervical cancer screening on file     Chief Complaint   Patient presents with    Follow-up Chronic Condition     BP 97/60 (Site: Left Upper Arm, Position: Sitting, Cuff Size: Small Adult)   Pulse 67   Temp 98.8 °F (37.1 °C) (Oral)   Resp 18   Ht 1.727 m (5' 8\")   Wt 82.4 kg (181 lb 9.6 oz)   SpO2 99%   BMI 27.61 kg/m²           Click Here for Release of Records Request   
states she was given propranolol in the past previosuly for anxiety which she takes about once a month.    Allergies   Allergen Reactions    Erythromycin Anaphylaxis    Sulfa Antibiotics Anaphylaxis     Current Outpatient Medications   Medication Sig Dispense Refill    propranolol (INDERAL) 10 MG tablet Take 1 tablet by mouth 3 times daily as needed (anxiety)      busPIRone (BUSPAR) 5 MG tablet Take 1 tablet by mouth 2 times daily 60 tablet 2    Multiple Vitamin (MULTIVITAMIN ADULT PO) Take by mouth daily       No current facility-administered medications for this visit.      Past Medical History:   Diagnosis Date    Anxiety     Asthma     Depression      Past Surgical History:   Procedure Laterality Date    UPPER GASTROINTESTINAL ENDOSCOPY N/A 2/14/2024    EGD ESOPHAGOGASTRODUODENOSCOPY performed by Jude Ritchie MD at [facility] ENDOSCOPY    UPPER GASTROINTESTINAL ENDOSCOPY N/A 2/14/2024    EGD BIOPSY performed by Jude Ritchie MD at [facility] ENDOSCOPY    WISDOM TOOTH EXTRACTION       Family History   Problem Relation Age of Onset    Cancer Mother     Anxiety Disorder Mother     Depression Mother     Hypertension Father         prediabetes    Celiac Disease Sister      Social History     Tobacco Use   Smoking Status Never    Passive exposure: Never   Smokeless Tobacco Never         Review of Systems   Constitutional:  Negative for fever.   Respiratory:  Negative for shortness of breath.    Cardiovascular:  Negative for chest pain.   Gastrointestinal:  Negative for constipation and diarrhea.   Neurological:  Negative for dizziness, seizures and syncope.   Psychiatric/Behavioral:  Negative for agitation and behavioral problems.            Vitals:    06/05/24 1111   BP: 97/60   Site: Left Upper Arm   Position: Sitting   Cuff Size: Small Adult   Pulse: 67   Resp: 18   Temp: 98.8 °F (37.1 °C)   TempSrc: Oral   SpO2: 99%   Weight: 82.4 kg (181 lb 9.6 oz)   Height: 1.727 m (5' 8\")      Physical Exam  Cardiovascular:      Rate and

## 2024-06-29 DIAGNOSIS — R41.840 DIFFICULTY CONCENTRATING: ICD-10-CM

## 2024-06-29 DIAGNOSIS — F41.9 ANXIETY: ICD-10-CM

## 2024-07-02 RX ORDER — BUSPIRONE HYDROCHLORIDE 5 MG/1
5 TABLET ORAL 2 TIMES DAILY
Qty: 180 TABLET | Refills: 1 | Status: SHIPPED | OUTPATIENT
Start: 2024-07-02

## 2024-11-06 ENCOUNTER — OFFICE VISIT (OUTPATIENT)
Facility: CLINIC | Age: 34
End: 2024-11-06

## 2024-11-06 VITALS
SYSTOLIC BLOOD PRESSURE: 98 MMHG | BODY MASS INDEX: 28.76 KG/M2 | TEMPERATURE: 98 F | HEIGHT: 68 IN | WEIGHT: 189.8 LBS | OXYGEN SATURATION: 98 % | RESPIRATION RATE: 18 BRPM | HEART RATE: 85 BPM | DIASTOLIC BLOOD PRESSURE: 58 MMHG

## 2024-11-06 DIAGNOSIS — J06.9 UPPER RESPIRATORY TRACT INFECTION, UNSPECIFIED TYPE: Primary | ICD-10-CM

## 2024-11-06 DIAGNOSIS — J02.9 SORE THROAT: ICD-10-CM

## 2024-11-06 DIAGNOSIS — R51.9 ACUTE NONINTRACTABLE HEADACHE, UNSPECIFIED HEADACHE TYPE: ICD-10-CM

## 2024-11-06 DIAGNOSIS — Z11.52 ENCOUNTER FOR SCREENING FOR COVID-19: ICD-10-CM

## 2024-11-06 LAB
INFLUENZA A ANTIGEN, POC: NEGATIVE
INFLUENZA B ANTIGEN, POC: NEGATIVE
Lab: NORMAL
QC PASS/FAIL: NORMAL
SARS-COV-2, POC: NORMAL
STREP PYOGENES DNA, POC: NEGATIVE
VALID INTERNAL CONTROL, POC: NORMAL
VALID INTERNAL CONTROL, POC: NORMAL

## 2024-11-06 RX ORDER — CETIRIZINE HYDROCHLORIDE, PSEUDOEPHEDRINE HYDROCHLORIDE 5; 120 MG/1; MG/1
1 TABLET, FILM COATED, EXTENDED RELEASE ORAL 2 TIMES DAILY PRN
Qty: 60 TABLET | Refills: 0 | Status: SHIPPED | OUTPATIENT
Start: 2024-11-06 | End: 2024-12-06

## 2024-11-06 ASSESSMENT — ENCOUNTER SYMPTOMS
COUGH: 1
SHORTNESS OF BREATH: 1

## 2024-11-06 NOTE — PROGRESS NOTES
Eboni Alvarez (: 1990) is a 34 y.o. female, Established patient patient, here for evaluation of the following chief complaint(s):  Pharyngitis, Congestion, and Headache       ASSESSMENT/PLAN:  Below is the assessment and plan developed based on review of pertinent history, physical exam, labs, studies, and medications.    1. Upper respiratory tract infection, unspecified type  -     cetirizine-psuedoephedrine (ZYRTEC-D) 5-120 MG per extended release tablet; Take 1 tablet by mouth 2 times daily as needed for Allergies, Disp-60 tablet, R-0Normal  2. Sore throat  -     AMB POC INFLUENZA A  AND B REAL-TIME RT-PCR  -     AMB POC STREP GO A DIRECT, DNA PROBE  3. Acute nonintractable headache, unspecified headache type  -     AMB POC INFLUENZA A  AND B REAL-TIME RT-PCR  4. Encounter for screening for COVID-19  -     POCT COVID-19, SARS-COV-2, PCR    Acute visit    Likely viral URI, discussed symptomatic management.  COVID flu and strep negative.  Signs of alarm explained.        SUBJECTIVE/OBJECTIVE:  HPI    Eboni Alvarez is a 34 Yyr old who presents for an acute visit.   She has sore throat, headache, nasal congestion.   She states the congestion started last week, had ears itching. She was given benadryl.  No fever, mild shortness of breath on exertion, no wheezing.     Allergies   Allergen Reactions    Erythromycin Anaphylaxis    Sulfa Antibiotics Anaphylaxis     Current Outpatient Medications   Medication Sig Dispense Refill    cetirizine-psuedoephedrine (ZYRTEC-D) 5-120 MG per extended release tablet Take 1 tablet by mouth 2 times daily as needed for Allergies 60 tablet 0    propranolol (INDERAL) 10 MG tablet Take 1 tablet by mouth 3 times daily as needed (anxiety)      Multiple Vitamin (MULTIVITAMIN ADULT PO) Take by mouth daily       No current facility-administered medications for this visit.      Past Medical History:   Diagnosis Date    Anxiety     Asthma     Depression      Past Surgical History:

## 2024-11-06 NOTE — PROGRESS NOTES
\"Have you been to the ER, urgent care clinic since your last visit?  Hospitalized since your last visit?\"    YES - When: approximately Sept 2024 ago.  Where and Why: [facility] for back pain.    “Have you seen or consulted any other health care providers outside of [facility] since your last visit?”    YES - When: approximately Sept 2024 ago.  Where and Why: Follow up with ortho.    Chief Complaint   Patient presents with    Pharyngitis    Congestion    Headache     BP (!) 98/58 (Site: Right Upper Arm, Position: Sitting, Cuff Size: Medium Adult)   Pulse 85   Temp 98 °F (36.7 °C) (Temporal)   Resp 18   Ht 1.727 m (5' 8\")   Wt 86.1 kg (189 lb 12.8 oz)   SpO2 98%   BMI 28.86 kg/m²           Click Here for Release of Records Request

## 2024-12-23 ENCOUNTER — TELEPHONE (OUTPATIENT)
Facility: CLINIC | Age: 34
End: 2024-12-23

## 2024-12-23 NOTE — TELEPHONE ENCOUNTER
----- Message from Farhad MAI sent at 12/23/2024  8:32 AM EST -----  Regarding: [facility] Appointment Request  [facility] Appointment Request    Patient needs appointment for [facility] Appointment Type: Annual Visit.    Patient Requested Dates(s):pt off until jan.05  Patient Requested Time:at anytime  Provider Name:Joi Young    Reason for Appointment Request: Established Patient - No appointments available during search  --------------------------------------------------------------------------------------------------------------------------    Relationship to Patient: Self     Call Back Information: OK to leave message on voicemail  Preferred Call Back Number: Phone [phone]

## 2025-07-22 ENCOUNTER — OFFICE VISIT (OUTPATIENT)
Facility: CLINIC | Age: 35
End: 2025-07-22
Payer: COMMERCIAL

## 2025-07-22 VITALS
DIASTOLIC BLOOD PRESSURE: 64 MMHG | SYSTOLIC BLOOD PRESSURE: 110 MMHG | HEIGHT: 68 IN | TEMPERATURE: 98.2 F | OXYGEN SATURATION: 100 % | BODY MASS INDEX: 29.19 KG/M2 | WEIGHT: 192.6 LBS | HEART RATE: 65 BPM | RESPIRATION RATE: 18 BRPM

## 2025-07-22 DIAGNOSIS — Z00.00 ANNUAL PHYSICAL EXAM: Primary | ICD-10-CM

## 2025-07-22 DIAGNOSIS — F41.9 ANXIETY: ICD-10-CM

## 2025-07-22 DIAGNOSIS — Z00.00 ANNUAL PHYSICAL EXAM: ICD-10-CM

## 2025-07-22 PROCEDURE — 99395 PREV VISIT EST AGE 18-39: CPT | Performed by: STUDENT IN AN ORGANIZED HEALTH CARE EDUCATION/TRAINING PROGRAM

## 2025-07-22 RX ORDER — HYDROXYZINE HYDROCHLORIDE 25 MG/1
25 TABLET, FILM COATED ORAL EVERY 8 HOURS PRN
Qty: 30 TABLET | Refills: 0 | Status: SHIPPED | OUTPATIENT
Start: 2025-07-22 | End: 2025-08-01

## 2025-07-22 RX ORDER — FLUOXETINE 10 MG/1
10 CAPSULE ORAL DAILY
Qty: 30 CAPSULE | Refills: 2 | Status: SHIPPED | OUTPATIENT
Start: 2025-07-22

## 2025-07-22 SDOH — ECONOMIC STABILITY: FOOD INSECURITY: WITHIN THE PAST 12 MONTHS, THE FOOD YOU BOUGHT JUST DIDN'T LAST AND YOU DIDN'T HAVE MONEY TO GET MORE.: NEVER TRUE

## 2025-07-22 SDOH — ECONOMIC STABILITY: FOOD INSECURITY: WITHIN THE PAST 12 MONTHS, YOU WORRIED THAT YOUR FOOD WOULD RUN OUT BEFORE YOU GOT MONEY TO BUY MORE.: NEVER TRUE

## 2025-07-22 ASSESSMENT — PATIENT HEALTH QUESTIONNAIRE - PHQ9
SUM OF ALL RESPONSES TO PHQ QUESTIONS 1-9: 2
1. LITTLE INTEREST OR PLEASURE IN DOING THINGS: SEVERAL DAYS
2. FEELING DOWN, DEPRESSED OR HOPELESS: SEVERAL DAYS

## 2025-07-22 NOTE — PROGRESS NOTES
\"Have you been to the ER, urgent care clinic since your last visit?  Hospitalized since your last visit?\"    NO    “Have you seen or consulted any other health care providers outside of [facility] since your last visit?”    NO    Chief Complaint   Patient presents with    Annual Exam     /64 (BP Site: Left Upper Arm, Patient Position: Sitting, BP Cuff Size: Medium Adult)   Pulse 65   Temp 98.2 °F (36.8 °C) (Temporal)   Resp 18   Ht 1.727 m (5' 8\")   Wt 87.4 kg (192 lb 9.6 oz)   LMP 07/04/2025   SpO2 100%   BMI 29.28 kg/m²               Click Here for Release of Records Request

## 2025-07-22 NOTE — PROGRESS NOTES
Eboni Alvarez (: 1990) is a 34 y.o. female, Established patient patient, here for evaluation of the following chief complaint(s):  Annual Exam       ASSESSMENT/PLAN:  Below is the assessment and plan developed based on review of pertinent history, physical exam, labs, studies, and medications.    1. Annual physical exam  -     CBC; Future  -     Comprehensive Metabolic Panel; Future  -     Lipid Panel; Future  -     TSH; Future  -     T4, Free; Future  2. Anxiety  -     FLUoxetine (PROZAC) 10 MG capsule; Take 1 capsule by mouth daily, Disp-30 capsule, R-2Normal  -     hydrOXYzine HCl (ATARAX) 25 MG tablet; Take 1 tablet by mouth every 8 hours as needed for Anxiety, Disp-30 tablet, R-0Normal      Blood work as above.  Started on Prozac, hydroxyzine as needed for anxiety.  Continue follow-up with gynecology.  Recommend annual ophthalmology, dentist visits.  No follow-ups on file.         SUBJECTIVE/OBJECTIVE:  HPI    Eboni Alvarez is a 34 yr old who presents for a physical.     She is having difficulty losing weight, she is trying weight watchers, hasn't helped much.   She did follow up with genetics, given family history of ovarian tumor.  Nothing concerning    She has tried Cymbalta, Zoloft in the past. Buspirone caused insomnia.   She states she was given propranolol in the past previosuly for anxiety which takes prn.   Allergies   Allergen Reactions    Erythromycin Anaphylaxis    Sulfa Antibiotics Anaphylaxis     Current Outpatient Medications   Medication Sig Dispense Refill    FLUoxetine (PROZAC) 10 MG capsule Take 1 capsule by mouth daily 30 capsule 2    hydrOXYzine HCl (ATARAX) 25 MG tablet Take 1 tablet by mouth every 8 hours as needed for Anxiety 30 tablet 0    propranolol (INDERAL) 10 MG tablet Take 1 tablet by mouth 3 times daily as needed (anxiety)      Multiple Vitamin (MULTIVITAMIN ADULT PO) Take by mouth daily       No current facility-administered medications for this visit.      Past

## 2025-07-25 LAB
ALBUMIN SERPL-MCNC: 4.6 G/DL (ref 3.9–4.9)
ALP SERPL-CCNC: 49 IU/L (ref 44–121)
ALT SERPL-CCNC: 13 IU/L (ref 0–32)
AST SERPL-CCNC: 12 IU/L (ref 0–40)
BILIRUB SERPL-MCNC: 0.7 MG/DL (ref 0–1.2)
BUN SERPL-MCNC: 13 MG/DL (ref 6–20)
BUN/CREAT SERPL: 15 (ref 9–23)
CALCIUM SERPL-MCNC: 9.4 MG/DL (ref 8.7–10.2)
CHLORIDE SERPL-SCNC: 103 MMOL/L (ref 96–106)
CHOLEST SERPL-MCNC: 152 MG/DL (ref 100–199)
CO2 SERPL-SCNC: 20 MMOL/L (ref 20–29)
CREAT SERPL-MCNC: 0.86 MG/DL (ref 0.57–1)
EGFRCR SERPLBLD CKD-EPI 2021: 90 ML/MIN/1.73
ERYTHROCYTE [DISTWIDTH] IN BLOOD BY AUTOMATED COUNT: 12 % (ref 11.7–15.4)
GLOBULIN SER CALC-MCNC: 2.3 G/DL (ref 1.5–4.5)
GLUCOSE SERPL-MCNC: 77 MG/DL (ref 70–99)
HCT VFR BLD AUTO: 43.5 % (ref 34–46.6)
HDLC SERPL-MCNC: 59 MG/DL
HGB BLD-MCNC: 13.7 G/DL (ref 11.1–15.9)
LDLC SERPL CALC-MCNC: 77 MG/DL (ref 0–99)
MCH RBC QN AUTO: 29 PG (ref 26.6–33)
MCHC RBC AUTO-ENTMCNC: 31.5 G/DL (ref 31.5–35.7)
MCV RBC AUTO: 92 FL (ref 79–97)
PLATELET # BLD AUTO: 208 X10E3/UL (ref 150–450)
POTASSIUM SERPL-SCNC: 4.5 MMOL/L (ref 3.5–5.2)
PROT SERPL-MCNC: 6.9 G/DL (ref 6–8.5)
RBC # BLD AUTO: 4.73 X10E6/UL (ref 3.77–5.28)
SODIUM SERPL-SCNC: 139 MMOL/L (ref 134–144)
T4 FREE SERPL-MCNC: 1.25 NG/DL (ref 0.82–1.77)
TRIGL SERPL-MCNC: 83 MG/DL (ref 0–149)
TSH SERPL DL<=0.005 MIU/L-ACNC: 1.22 UIU/ML (ref 0.45–4.5)
VLDLC SERPL CALC-MCNC: 16 MG/DL (ref 5–40)
WBC # BLD AUTO: 7.2 X10E3/UL (ref 3.4–10.8)

## 2025-08-14 DIAGNOSIS — F41.9 ANXIETY: ICD-10-CM

## 2025-08-14 RX ORDER — FLUOXETINE 10 MG/1
CAPSULE ORAL DAILY
Qty: 90 CAPSULE | Refills: 1 | Status: SHIPPED | OUTPATIENT
Start: 2025-08-14

## (undated) DEVICE — KIT ENDOSCOPIC  PROC VIA

## (undated) DEVICE — LINE SAMPLING ADVANCE ORAL NASAL MICROSTREAM O2 TUBING 6.5'

## (undated) DEVICE — BITE BLOCK ENDOSCP AD 60 FR W/ ADJ STRP PLAS GRN BLOX